# Patient Record
Sex: FEMALE | Race: WHITE | NOT HISPANIC OR LATINO | URBAN - METROPOLITAN AREA
[De-identification: names, ages, dates, MRNs, and addresses within clinical notes are randomized per-mention and may not be internally consistent; named-entity substitution may affect disease eponyms.]

---

## 2018-03-02 ENCOUNTER — EMERGENCY (EMERGENCY)
Facility: HOSPITAL | Age: 23
LOS: 1 days | Discharge: HOME | End: 2018-03-02
Attending: EMERGENCY MEDICINE | Admitting: EMERGENCY MEDICINE
Payer: COMMERCIAL

## 2018-03-02 VITALS
SYSTOLIC BLOOD PRESSURE: 135 MMHG | OXYGEN SATURATION: 98 % | TEMPERATURE: 98 F | HEIGHT: 64 IN | WEIGHT: 125 LBS | HEART RATE: 73 BPM | RESPIRATION RATE: 18 BRPM | DIASTOLIC BLOOD PRESSURE: 91 MMHG

## 2018-03-02 DIAGNOSIS — R07.81 PLEURODYNIA: ICD-10-CM

## 2018-03-02 PROCEDURE — 71046 X-RAY EXAM CHEST 2 VIEWS: CPT

## 2018-03-02 PROCEDURE — 93010 ELECTROCARDIOGRAM REPORT: CPT

## 2018-03-02 PROCEDURE — 93005 ELECTROCARDIOGRAM TRACING: CPT

## 2018-03-02 PROCEDURE — 99283 EMERGENCY DEPT VISIT LOW MDM: CPT | Mod: 25

## 2018-03-02 PROCEDURE — 71046 X-RAY EXAM CHEST 2 VIEWS: CPT | Mod: 26

## 2018-03-02 PROCEDURE — 99284 EMERGENCY DEPT VISIT MOD MDM: CPT | Mod: 25

## 2018-03-02 RX ORDER — METHOCARBAMOL 500 MG/1
1 TABLET, FILM COATED ORAL
Qty: 21 | Refills: 0 | OUTPATIENT
Start: 2018-03-02 | End: 2018-03-08

## 2018-03-02 RX ADMIN — Medication 500 MILLIGRAM(S): at 15:29

## 2018-03-02 NOTE — ED PROVIDER NOTE - OBJECTIVE STATEMENT
22 F co L rib pain- sneezed today and felt a sharp pop on the L side of her chest   worse w deep inspiration no sob  no n/v  exac w movement  no sig allev factors  no meds taken for pain  no hx of syncope

## 2018-03-02 NOTE — ED ADULT NURSE NOTE - OBJECTIVE STATEMENT
21y F, A&ox3, presents to ED for left rib pain. PT reports broke a rib after coughing x2 weeks ago.  Reports went to an urgent care and noted 6th left rib fracture. Today reports sneezed and felt sharp left sided rib pain. No sob. No tactile crepitus. Chest rise symmetrical, no tracheal deviation. LUngs clear bilateral. Will continue to monitor.

## 2018-03-02 NOTE — ED ADULT TRIAGE NOTE - CHIEF COMPLAINT QUOTE
Pt CO Rib Cage Pain x1 month.  Pt states "I had a really bad cough and found out that I actually cracked a rib from coughing, but this morning I had a really bad sneeze and I feel like it popped."  PT denies N/V/D, SOB, Fevers, and CP

## 2020-03-01 NOTE — ED ADULT NURSE NOTE - CHPI ED SYMPTOMS NEG
unable to assess
no hemoptysis/no edema/no headache/no chills/no body aches/no wheezing/no diaphoresis/no chest pain/no fever/no shortness of breath/no cough
